# Patient Record
Sex: FEMALE | Race: OTHER | Employment: UNEMPLOYED | URBAN - METROPOLITAN AREA
[De-identification: names, ages, dates, MRNs, and addresses within clinical notes are randomized per-mention and may not be internally consistent; named-entity substitution may affect disease eponyms.]

---

## 2022-03-18 ENCOUNTER — HOSPITAL ENCOUNTER (EMERGENCY)
Age: 27
Discharge: HOME | End: 2022-03-19
Attending: EMERGENCY MEDICINE

## 2022-03-18 ENCOUNTER — APPOINTMENT (OUTPATIENT)
Dept: ULTRASOUND IMAGING | Age: 27
End: 2022-03-18

## 2022-03-18 DIAGNOSIS — N76.0 BACTERIAL VAGINOSIS: ICD-10-CM

## 2022-03-18 DIAGNOSIS — Z3A.01 7 WEEKS GESTATION OF PREGNANCY: Primary | ICD-10-CM

## 2022-03-18 DIAGNOSIS — B96.89 BACTERIAL VAGINOSIS: ICD-10-CM

## 2022-03-18 DIAGNOSIS — B37.9 YEAST INFECTION: ICD-10-CM

## 2022-03-18 LAB
-: NORMAL
ABO/RH: NORMAL
ABSOLUTE EOS #: 0.24 K/UL (ref 0–0.44)
ABSOLUTE IMMATURE GRANULOCYTE: 0.04 K/UL (ref 0–0.3)
ABSOLUTE LYMPH #: 2.37 K/UL (ref 1.1–3.7)
ABSOLUTE MONO #: 0.85 K/UL (ref 0.1–1.2)
ANTIBODY SCREEN: NEGATIVE
ARM BAND NUMBER: NORMAL
BASOPHILS # BLD: 1 % (ref 0–2)
BASOPHILS ABSOLUTE: 0.06 K/UL (ref 0–0.2)
BILIRUBIN URINE: NEGATIVE
CANDIDA SPECIES, DNA PROBE: POSITIVE
CASTS UA: NORMAL /LPF (ref 0–8)
COLOR: YELLOW
EOSINOPHILS RELATIVE PERCENT: 3 % (ref 1–4)
EPITHELIAL CELLS UA: NORMAL /HPF (ref 0–5)
EXPIRATION DATE: NORMAL
GARDNERELLA VAGINALIS, DNA PROBE: POSITIVE
GLUCOSE URINE: NEGATIVE
HCG QUANTITATIVE: ABNORMAL IU/L
HCT VFR BLD CALC: 40.4 % (ref 36.3–47.1)
HEMOGLOBIN: 13.1 G/DL (ref 11.9–15.1)
IMMATURE GRANULOCYTES: 0 %
KETONES, URINE: NEGATIVE
LEUKOCYTE ESTERASE, URINE: NEGATIVE
LYMPHOCYTES # BLD: 25 % (ref 24–43)
MCH RBC QN AUTO: 27.2 PG (ref 25.2–33.5)
MCHC RBC AUTO-ENTMCNC: 32.4 G/DL (ref 28.4–34.8)
MCV RBC AUTO: 83.8 FL (ref 82.6–102.9)
MONOCYTES # BLD: 9 % (ref 3–12)
NITRITE, URINE: NEGATIVE
NRBC AUTOMATED: 0 PER 100 WBC
PDW BLD-RTO: 14 % (ref 11.8–14.4)
PH UA: 7 (ref 5–8)
PLATELET # BLD: 227 K/UL (ref 138–453)
PMV BLD AUTO: 12.7 FL (ref 8.1–13.5)
PROTEIN UA: NEGATIVE
RBC # BLD: 4.82 M/UL (ref 3.95–5.11)
RBC UA: NORMAL /HPF (ref 0–4)
SEG NEUTROPHILS: 62 % (ref 36–65)
SEGMENTED NEUTROPHILS ABSOLUTE COUNT: 6.07 K/UL (ref 1.5–8.1)
SOURCE: ABNORMAL
SPECIFIC GRAVITY UA: 1.01 (ref 1–1.03)
TRICHOMONAS VAGINALIS DNA: NEGATIVE
TURBIDITY: CLEAR
URINE HGB: NEGATIVE
UROBILINOGEN, URINE: NORMAL
WBC # BLD: 9.6 K/UL (ref 3.5–11.3)
WBC UA: NORMAL /HPF (ref 0–5)

## 2022-03-18 PROCEDURE — 87510 GARDNER VAG DNA DIR PROBE: CPT

## 2022-03-18 PROCEDURE — 87491 CHLMYD TRACH DNA AMP PROBE: CPT

## 2022-03-18 PROCEDURE — 87591 N.GONORRHOEAE DNA AMP PROB: CPT

## 2022-03-18 PROCEDURE — 86850 RBC ANTIBODY SCREEN: CPT

## 2022-03-18 PROCEDURE — 85025 COMPLETE CBC W/AUTO DIFF WBC: CPT

## 2022-03-18 PROCEDURE — 81001 URINALYSIS AUTO W/SCOPE: CPT

## 2022-03-18 PROCEDURE — 84702 CHORIONIC GONADOTROPIN TEST: CPT

## 2022-03-18 PROCEDURE — 86900 BLOOD TYPING SEROLOGIC ABO: CPT

## 2022-03-18 PROCEDURE — 87480 CANDIDA DNA DIR PROBE: CPT

## 2022-03-18 PROCEDURE — 87660 TRICHOMONAS VAGIN DIR PROBE: CPT

## 2022-03-18 PROCEDURE — 99285 EMERGENCY DEPT VISIT HI MDM: CPT

## 2022-03-18 PROCEDURE — 86901 BLOOD TYPING SEROLOGIC RH(D): CPT

## 2022-03-18 PROCEDURE — 6370000000 HC RX 637 (ALT 250 FOR IP)

## 2022-03-18 RX ORDER — ONDANSETRON 4 MG/1
4 TABLET, ORALLY DISINTEGRATING ORAL ONCE
Status: COMPLETED | OUTPATIENT
Start: 2022-03-18 | End: 2022-03-18

## 2022-03-18 RX ORDER — ACETAMINOPHEN 500 MG
1000 TABLET ORAL ONCE
Status: COMPLETED | OUTPATIENT
Start: 2022-03-18 | End: 2022-03-18

## 2022-03-18 RX ADMIN — ONDANSETRON 4 MG: 4 TABLET, ORALLY DISINTEGRATING ORAL at 21:00

## 2022-03-18 RX ADMIN — ACETAMINOPHEN 1000 MG: 500 TABLET ORAL at 21:00

## 2022-03-18 ASSESSMENT — PAIN DESCRIPTION - LOCATION: LOCATION: ABDOMEN

## 2022-03-18 ASSESSMENT — ENCOUNTER SYMPTOMS
RESPIRATORY NEGATIVE: 1
ALLERGIC/IMMUNOLOGIC NEGATIVE: 1
EYES NEGATIVE: 1
VOMITING: 1
NAUSEA: 1
ABDOMINAL PAIN: 1

## 2022-03-18 ASSESSMENT — PAIN - FUNCTIONAL ASSESSMENT: PAIN_FUNCTIONAL_ASSESSMENT: 0-10

## 2022-03-18 ASSESSMENT — PAIN SCALES - GENERAL
PAINLEVEL_OUTOF10: 6
PAINLEVEL_OUTOF10: 5

## 2022-03-19 ENCOUNTER — APPOINTMENT (OUTPATIENT)
Dept: ULTRASOUND IMAGING | Age: 27
End: 2022-03-19

## 2022-03-19 VITALS
RESPIRATION RATE: 16 BRPM | SYSTOLIC BLOOD PRESSURE: 114 MMHG | TEMPERATURE: 98.3 F | DIASTOLIC BLOOD PRESSURE: 58 MMHG | OXYGEN SATURATION: 98 % | HEART RATE: 78 BPM

## 2022-03-19 PROCEDURE — 87086 URINE CULTURE/COLONY COUNT: CPT

## 2022-03-19 PROCEDURE — 76817 TRANSVAGINAL US OBSTETRIC: CPT

## 2022-03-19 PROCEDURE — 6370000000 HC RX 637 (ALT 250 FOR IP): Performed by: STUDENT IN AN ORGANIZED HEALTH CARE EDUCATION/TRAINING PROGRAM

## 2022-03-19 RX ORDER — METRONIDAZOLE 500 MG/1
500 TABLET ORAL 2 TIMES DAILY
Qty: 14 TABLET | Refills: 0 | Status: SHIPPED | OUTPATIENT
Start: 2022-03-19 | End: 2022-03-26

## 2022-03-19 RX ORDER — MICONAZOLE NITRATE 200 MG/1
200 SUPPOSITORY VAGINAL NIGHTLY
Qty: 3 SUPPOSITORY | Refills: 0 | Status: SHIPPED | OUTPATIENT
Start: 2022-03-19 | End: 2022-03-22

## 2022-03-19 RX ORDER — METRONIDAZOLE 500 MG/1
500 TABLET ORAL ONCE
Status: COMPLETED | OUTPATIENT
Start: 2022-03-19 | End: 2022-03-19

## 2022-03-19 RX ADMIN — METRONIDAZOLE 500 MG: 500 TABLET ORAL at 02:40

## 2022-03-19 NOTE — ED PROVIDER NOTES
[facility] ED     Emergency Department     Faculty Attestation    I performed a history and physical examination of the patient and discussed management with the resident. I reviewed the residents note and agree with the documented findings and plan of care. Any areas of disagreement are noted on the chart. I was personally present for the key portions of any procedures. I have documented in the chart those procedures where I was not present during the key portions. I have reviewed the emergency nurses triage note. I agree with the chief complaint, past medical history, past surgical history, allergies, medications, social and family history as documented unless otherwise noted below. For Physician Assistant/ Nurse Practitioner cases/documentation I have personally evaluated this patient and have completed at least one if not all key elements of the E/M (history, physical exam, and MDM). Additional findings are as noted. This patient was evaluated in the Emergency Department for symptoms described in the history of present illness. He/she was evaluated in the context of the global COVID-19 pandemic, which necessitated consideration that the patient might be at risk for infection with the SARS-CoV-2 virus that causes COVID-19. Institutional protocols and algorithms that pertain to the evaluation of patients at risk for COVID-19 are in a state of rapid change based on information released by regulatory bodies including the CDC and federal and state organizations. These policies and algorithms were followed during the patient's care in the ED. History and physical obtained with assistance of formal  service on the iPad. Patient here with lower abdominal pain cramping positive pregnancy test 5 days ago. LMP was 1/7/2022. Nausea significantly with some vomiting no blood in the emesis. No urinary complaints no vaginal bleeding but mild discharge. On exam nontoxic well-appearing. Abdomen soft inconsistent tenderness epigastric bilateral lower quadrants no rebound no guarding left CVA tenderness.   See resident note for pelvic exam.  Will check labs confirm pregnancy if positive order first trimester transvaginal sono for rule out ectopic      Critical Care     none    Delmis Frost MD, Adelso Ha  Attending Emergency  Physician             Delmis Frost MD  03/18/22 2100

## 2022-03-19 NOTE — ED PROVIDER NOTES
[address]  ED  Emergency Department Encounter  EmergencyMedicine Resident     Pt Name:Georgie Poe  MRN: [id number]  [place] 1995  Date of evaluation: 3/18/22  PCP:  No primary care provider on file. This patient was evaluated in the Emergency Department for symptoms described in the history of present illness. The patient was evaluated in the context of the global COVID-19 pandemic, which necessitated consideration that the patient might be at risk for infection with the SARS-CoV-2 virus that causes COVID-19. Institutional protocols and algorithms that pertain to the evaluation of patients at risk for COVID-19 are in a state of rapid change based on information released by regulatory bodies including the CDC and federal and state organizations. These policies and algorithms were followed during the patient's care in the ED. CHIEF COMPLAINT       Chief Complaint   Patient presents with    Abdominal Pain     pt stated she took a preg test and it was positive and now having pain wants to be evaluated     Nausea     HISTORY OF PRESENT ILLNESS  (Location/Symptom, Timing/Onset, Context/Setting, Quality, Duration, Modifying Factors, Severity.)      Georgie Poe is a 23 y.o. female  who presents with complaints of epigastric and bilateral lower abdominal pain for 2 weeks. She is Azerbaijani speaking and history was obtained with the use of a . Patient states that the pain is sharp and intermittent. She states that she did not come to the ED when the pain first began because she felt ashamed that she does not speak english. She states that the pain does not radiate. No associated fevers/chills, no abnormal vaginal discharge or vaginal bleeding. She has not had this type of pain in the past. Patient has also had associated nausea and vomiting for the past 2 weeks but has been able to tolerate PO intake.  Her LMP was on 22 and she took a home pregnancy test at home 5 days ago which was positive. Patient states that this was a planned pregnancy and she does not yet have an OB Gyn physician. Denies any chronic medical conditions. She states that she has had one vaginal delivery in the past. Denies any abortions/miscarriages and denies any hx ectopic pregnancy. PAST MEDICAL / SURGICAL / SOCIAL / FAMILY HISTORY      has no past medical history on file. has no past surgical history on file. Social History     Socioeconomic History    Marital status: Single     Spouse name: Not on file    Number of children: Not on file    Years of education: Not on file    Highest education level: Not on file   Occupational History    Not on file   Tobacco Use    Smoking status: Not on file    Smokeless tobacco: Not on file   Substance and Sexual Activity    Alcohol use: Not on file    Drug use: Not on file    Sexual activity: Not on file   Other Topics Concern    Not on file   Social History Narrative    Not on file     Social Determinants of Health     Financial Resource Strain:     Difficulty of Paying Living Expenses: Not on file   Food Insecurity:     Worried About Running Out of Food in the Last Year: Not on file    Stanislav of Food in the Last Year: Not on file   Transportation Needs:     Lack of Transportation (Medical): Not on file    Lack of Transportation (Non-Medical):  Not on file   Physical Activity:     Days of Exercise per Week: Not on file    Minutes of Exercise per Session: Not on file   Stress:     Feeling of Stress : Not on file   Social Connections:     Frequency of Communication with Friends and Family: Not on file    Frequency of Social Gatherings with Friends and Family: Not on file    Attends Sikhism Services: Not on file    Active Member of Clubs or Organizations: Not on file    Attends Club or Organization Meetings: Not on file    Marital Status: Not on file   Intimate Partner Violence:     Fear of Current or Ex-Partner: Not on file    Emotionally Abused: Not on file    Physically Abused: Not on file    Sexually Abused: Not on file   Housing Stability:     Unable to Pay for Housing in the Last Year: Not on file    Number of Places Lived in the Last Year: Not on file    Unstable Housing in the Last Year: Not on file       No family history on file. Allergies:  Patient has no known allergies. Home Medications:  Prior to Admission medications    Not on File       REVIEW OF SYSTEMS    (2-9 systems for level 4, 10 or more for level 5)      Review of Systems   Constitutional: Negative. HENT: Negative. Eyes: Negative. Respiratory: Negative. Cardiovascular: Negative. Gastrointestinal: Positive for abdominal pain, nausea and vomiting. Endocrine: Negative. Genitourinary: Negative. Musculoskeletal: Negative. Skin: Negative. Allergic/Immunologic: Negative. Neurological: Negative. Hematological: Negative. Psychiatric/Behavioral: Negative. PHYSICAL EXAM   (up to 7 for level 4, 8 or more for level 5)      INITIAL VITALS:   /71   Pulse 92   Temp 98.3 °F (36.8 °C) (Oral)   Resp 18   LMP 01/07/2022   SpO2 100%     Physical Exam  Vitals and nursing note reviewed. Exam conducted with a chaperone present. Constitutional:       General: She is not in acute distress. Appearance: She is well-developed. HENT:      Head: Normocephalic and atraumatic. Mouth/Throat:      Mouth: Mucous membranes are moist.   Eyes:      Pupils: Pupils are equal, round, and reactive to light. Cardiovascular:      Rate and Rhythm: Normal rate. Heart sounds: Normal heart sounds. Pulmonary:      Effort: Pulmonary effort is normal.   Abdominal:      General: Abdomen is flat. Bowel sounds are normal. There is no distension. Palpations: Abdomen is soft. Tenderness: There is abdominal tenderness in the right lower quadrant, epigastric area and left lower quadrant.       Comments: Pain with deep palpation of the epigastric region. Pain with palpation of LLQ and RLQ. No guarding or rigidity. Abdomen is soft. Bowel sounds normal.   Genitourinary:     Vagina: Normal.      Cervix: Normal.      Adnexa:         Right: Tenderness present. Left: No tenderness. Comments: External cervical os visually closed. Scant vaginal discharge present. Tenderness with palpation of the right adnexa and over the bladder. Uterus appears 8-10 weeks on bimanual exam  Musculoskeletal:         General: Normal range of motion. Cervical back: Normal range of motion. Skin:     General: Skin is warm. Capillary Refill: Capillary refill takes less than 2 seconds. Neurological:      General: No focal deficit present. Mental Status: She is alert.    Psychiatric:         Mood and Affect: Mood normal.       DIFFERENTIAL  DIAGNOSIS     PLAN (LABS / IMAGING / EKG):  Orders Placed This Encounter   Procedures    Culture, Urine    C.trachomatis N.gonorrhoeae DNA    VAGINITIS DNA PROBE    CBC with Auto Differential    HCG, QUANTITATIVE, PREGNANCY    Urinalysis with Microscopic    TYPE AND SCREEN       MEDICATIONS ORDERED:  Orders Placed This Encounter   Medications    acetaminophen (TYLENOL) tablet 1,000 mg    ondansetron (ZOFRAN-ODT) disintegrating tablet 4 mg     DDX: Intrauterine pregnancy vs ectopic pregnancy    DIAGNOSTIC RESULTS / EMERGENCY DEPARTMENT COURSE / MDM   LAB RESULTS:  Results for orders placed or performed during the hospital encounter of 03/18/22   CBC with Auto Differential   Result Value Ref Range    WBC 9.6 3.5 - 11.3 k/uL    RBC 4.82 3.95 - 5.11 m/uL    Hemoglobin 13.1 11.9 - 15.1 g/dL    Hematocrit 40.4 36.3 - 47.1 %    MCV 83.8 82.6 - 102.9 fL    MCH 27.2 25.2 - 33.5 pg    MCHC 32.4 28.4 - 34.8 g/dL    RDW 14.0 11.8 - 14.4 %    Platelets 964 453 - 106 k/uL    MPV 12.7 8.1 - 13.5 fL    NRBC Automated 0.0 0.0 per 100 WBC    Seg Neutrophils 62 36 - 65 %    Lymphocytes 25 24 - 43 % Monocytes 9 3 - 12 %    Eosinophils % 3 1 - 4 %    Basophils 1 0 - 2 %    Immature Granulocytes 0 0 %    Segs Absolute 6.07 1.50 - 8.10 k/uL    Absolute Lymph # 2.37 1.10 - 3.70 k/uL    Absolute Mono # 0.85 0.10 - 1.20 k/uL    Absolute Eos # 0.24 0.00 - 0.44 k/uL    Basophils Absolute 0.06 0.00 - 0.20 k/uL    Absolute Immature Granulocyte 0.04 0.00 - 0.30 k/uL   TYPE AND SCREEN   Result Value Ref Range    Expiration Date 2022,2359     Arm Band Number BE [id number]     ABO/Rh O POSITIVE     Antibody Screen NEGATIVE      IMPRESSION: Patient is a 24 yo  with a LMP of 22 and positive home pregnancy test presents with abdominal pain associated with nausea and vomiting for 2 weeks. Patient VSS, afebrile. No acute distress. Epigastric, LLQ and RLQ pain with deep palpation. No vaginal bleeding or abdominal discharge. R adnexal tenderness present and tenderness over the bladder. Gc/C, Vag collected. UA and Ucx sent. CBC, T&S, HCG quant ordered. Will plan for TVUS once HCG results to r/o ectopic pregnancy. Tylenol and Zofran given for abdominal pain and nausea    RADIOLOGY:  TVUS pending HCG quant     CONSULTS:  None    CRITICAL CARE:  Please see attending physician note    FINAL IMPRESSION      No diagnosis found. DISPOSITION / PLAN     DISPOSITION      PATIENT REFERRED TO:  No follow-up provider specified.     DISCHARGE MEDICATIONS:  New Prescriptions    No medications on file       Nicole Chavez MD  Emergency Medicine Resident    (Please note that portions of thisnote were completed with a voice recognition program.  Efforts were made to edit the dictations but occasionally words are mis-transcribed.)        Nicole Chavez MD  Resident  [phone]

## 2022-03-19 NOTE — ED PROVIDER NOTES
[address]   Emergency Department  Emergency Medicine Attending Sign-out     Care of [address] was assumed from previous attending Dr. Jorge Marrero and is being seen for Abdominal Pain (pt stated she took a preg test and it was positive and now having pain wants to be evaluated ) and Nausea  . The patient's initial evaluation and plan have been discussed with the prior provider who initially evaluated the patient. Attestation  I was available and discussed any additional care issues that arose and coordinated the management plans with the resident(s) caring for the patient during my duty period. Any areas of disagreement with resident's documentation of care or procedures are noted on the chart. I was personally present for the key portions of any/all procedures, during my duty period. I have documented in the chart those procedures where I was not present during the key portions. BRIEF PATIENT SUMMARY/MDM COURSE PER INITIAL PROVIDER:   RECENT VITALS:     Temp: 98.3 °F (36.8 °C),  Pulse: 92, Resp: 18, BP: 139/71, SpO2: 100 %    This patient is a 23 y.o. Female with pregnancy include gestational age. Last menstrual period approximate 7 weeks ago. Setting consistent abdominal pain.   Awaiting transvaginal ultrasound    DIAGNOSTICS/MEDICATIONS:     MEDICATIONS GIVEN:  ED Medication Orders (From admission, onward)    Start Ordered     Status Ordering Provider    03/18/22 2100 03/18/22 2054  acetaminophen (TYLENOL) tablet 1,000 mg  ONCE         Last MAR action: Given - by Sherrill Truong on 03/18/22 at [facility]    03/18/22 2100 03/18/22 2054  ondansetron (ZOFRAN-ODT) disintegrating tablet 4 mg  ONCE         Last MAR action: Given - by Sherrill Truong on 03/18/22 at 2[facility], [address] DNA PROBE - Abnormal; Notable for the following components:       Result Value    GARDNERELLA VAGINALIS, DNA PROBE POSITIVE (*)     SALONI SPECIES, DNA PROBE POSITIVE (*)     All other components within normal limits   HCG, QUANTITATIVE, PREGNANCY - Abnormal; Notable for the following components:    hCG Quant 62,535 (*)     All other components within normal limits   CULTURE, URINE   C.TRACHOMATIS N.GONORRHOEAE DNA   CBC WITH AUTO DIFFERENTIAL   URINALYSIS WITH MICROSCOPIC   TYPE AND SCREEN       RADIOLOGY  No results found. OUTSTANDING TASKS / ADDITIONAL COMMENTS   1.  Transvaginal ultrasound    Chucho Townsend MD  Emergency Medicine Attending  [address] Dilma Chen MD  03/18/22 0372

## 2022-03-19 NOTE — ED PROVIDER NOTES
[facility]  [facility]  Emergency Department  Emergency Medicine Resident Sign-out     Care of [address] [facility] was assumed from Dr. Jodie Aragon and is being seen for Abdominal Pain (pt stated she took a preg test and it was positive and now having pain wants to be evaluated ) and Nausea  . The patient's initial evaluation and plan have been discussed with the prior provider who initially evaluated the patient. EMERGENCY DEPARTMENT COURSE / MEDICAL DECISION MAKING:       MEDICATIONS GIVEN:  Orders Placed This Encounter   Medications    acetaminophen (TYLENOL) tablet 1,000 mg    ondansetron (ZOFRAN-ODT) disintegrating tablet 4 mg    metroNIDAZOLE (FLAGYL) tablet 500 mg     Order Specific Question:   Antimicrobial Indications     Answer:   OB/Gyn Infection    metroNIDAZOLE (FLAGYL) 500 MG tablet     Sig: Take 1 tablet by mouth 2 times daily for 7 days     Dispense:  14 tablet     Refill:  0    miconazole (MICONAZOLE 3) 200 MG vaginal suppository     Sig: Place 1 suppository vaginally nightly for 3 days     Dispense:  3 suppository     Refill:  0       LABS / RADIOLOGY:     Labs Reviewed   VAGINITIS DNA PROBE - Abnormal; Notable for the following components:       Result Value    GARDNERELLA VAGINALIS, DNA PROBE POSITIVE (*)     CANDIDA SPECIES, DNA PROBE POSITIVE (*)     All other components within normal limits   HCG, QUANTITATIVE, PREGNANCY - Abnormal; Notable for the following components:    hCG Quant 98,387 (*)     All other components within normal limits   C.TRACHOMATIS N.GONORRHOEAE DNA   CULTURE, URINE   CBC WITH AUTO DIFFERENTIAL   URINALYSIS WITH MICROSCOPIC   TYPE AND SCREEN       No results found. RECENT VITALS:     Temp: 98.3 °F (36.8 °C),  Pulse: 78, Resp: 16, BP: (!) 114/58, SpO2: 98 %      This patient is a 23 y.o. Female  who had a home positive pregnancy test 5 days ago who presents with 2 weeks of abdominal pain, nausea and vomiting.   Patient with bilateral lower quadrant tenderness and left CVA tenderness on physical exam.  Pelvic exam was unremarkable with exception of right adnexal pain. Labs are pending at this time. If beta-hCG is positive plan for ultrasound. Patient does not currently follow with OB. ED Course as of 03/19/22 0703   Fri Mar 18, 2022   2218 WBC: 9.6 [KA]     2218 Hemoglobin Quant: 13.1 [KA]     2238 CANDIDA SPECIES, DNA PROBE(!): POSITIVE [KA]     2238 GARDNERELLA VAGINALIS, DNA PROBE(!): POSITIVE [KA]     2257 hCG Quant(!): 70,655  Ultrasound ordered [KA]     2354 Patient updated on plan of care. Pending ultrasound [KA]   Sat Mar 19, 2022   0145          0230 US OB TRANSVAGINAL  Single living intrauterine gestation with estimated gestational age of 11  weeks, 3 days. Patient was updated on reassuring work-up including normal ultrasound. Will treat for BV in the emergency department and provide prescription for Flagyl, as well as prescription for miconazole suppositories for yeast infection. Discussed with patient that her cramping could possibly be related to the yeast infection and the nausea and vomiting related to the pregnancy, however I am not exactly sure what is causing her pain. Recommended that she follow-up with OB as soon as possible and referral was given. She is to return to the emergency department for any worsening symptoms or if she develops any vaginal bleeding.  [KA]      ED Course User Index  [KA] Crystal Kim DO       OUTSTANDING TASKS / RECOMMENDATIONS:    1. Labs  2. Ultrasound ordered  3. OB consult? FINAL IMPRESSION:     1. 7 weeks gestation of pregnancy    2. Bacterial vaginosis    3.  Yeast infection        DISPOSITION:         DISPOSITION:  [x]  Discharge   []  Transfer -    []  Admission -     []  Against Medical Advice   []  Eloped   FOLLOW-UP: DO MIAH George[address] [place] [address]  [address] [facility] [address] [facility] [address]  [phone]    OBGYN clinic    [facility] ED  [address] [address]  [phone]    If symptoms worsen     DISCHARGE MEDICATIONS: Discharge Medication List as of 3/19/2022  2:38 AM      START taking these medications    Details   metroNIDAZOLE (FLAGYL) 500 MG tablet Take 1 tablet by mouth 2 times daily for 7 days, Disp-14 tablet, R-0Print      miconazole (MICONAZOLE 3) 200 MG vaginal suppository Place 1 suppository vaginally nightly for 3 days, Disp-3 suppository, R-0Print                Tucker Myers DO  Emergency Medicine Resident  [facility]        Tucker Myers [place]  Resident  03/19/22 8406

## 2022-03-20 LAB
CULTURE: NORMAL
SPECIMEN DESCRIPTION: NORMAL

## 2022-03-21 LAB
C TRACH DNA GENITAL QL NAA+PROBE: NEGATIVE
N. GONORRHOEAE DNA: NEGATIVE
SPECIMEN DESCRIPTION: NORMAL